# Patient Record
Sex: MALE | Race: BLACK OR AFRICAN AMERICAN | NOT HISPANIC OR LATINO | Employment: FULL TIME | URBAN - METROPOLITAN AREA
[De-identification: names, ages, dates, MRNs, and addresses within clinical notes are randomized per-mention and may not be internally consistent; named-entity substitution may affect disease eponyms.]

---

## 2023-03-07 ENCOUNTER — HOSPITAL ENCOUNTER (EMERGENCY)
Facility: HOSPITAL | Age: 43
Discharge: HOME | End: 2023-03-07
Attending: EMERGENCY MEDICINE
Payer: MEDICAID

## 2023-03-07 VITALS
WEIGHT: 205 LBS | RESPIRATION RATE: 20 BRPM | BODY MASS INDEX: 28.7 KG/M2 | HEART RATE: 106 BPM | SYSTOLIC BLOOD PRESSURE: 143 MMHG | HEIGHT: 71 IN | OXYGEN SATURATION: 99 % | DIASTOLIC BLOOD PRESSURE: 90 MMHG

## 2023-03-07 DIAGNOSIS — M25.559 HIP PAIN: ICD-10-CM

## 2023-03-07 DIAGNOSIS — M54.31 SCIATICA, RIGHT SIDE: Primary | ICD-10-CM

## 2023-03-07 PROCEDURE — 63600175 PHARM REV CODE 636 W HCPCS: Performed by: EMERGENCY MEDICINE

## 2023-03-07 PROCEDURE — 96372 THER/PROPH/DIAG INJ SC/IM: CPT | Performed by: EMERGENCY MEDICINE

## 2023-03-07 PROCEDURE — 99284 EMERGENCY DEPT VISIT MOD MDM: CPT

## 2023-03-07 PROCEDURE — 25000003 PHARM REV CODE 250: Performed by: EMERGENCY MEDICINE

## 2023-03-07 RX ORDER — HYDROCODONE BITARTRATE AND ACETAMINOPHEN 10; 325 MG/1; MG/1
1 TABLET ORAL
Status: COMPLETED | OUTPATIENT
Start: 2023-03-07 | End: 2023-03-07

## 2023-03-07 RX ORDER — KETOROLAC TROMETHAMINE 30 MG/ML
60 INJECTION, SOLUTION INTRAMUSCULAR; INTRAVENOUS
Status: COMPLETED | OUTPATIENT
Start: 2023-03-07 | End: 2023-03-07

## 2023-03-07 RX ORDER — PREDNISONE 50 MG/1
50 TABLET ORAL DAILY
Qty: 5 TABLET | Refills: 0 | Status: SHIPPED | OUTPATIENT
Start: 2023-03-07 | End: 2023-03-12

## 2023-03-07 RX ORDER — HYDROCODONE BITARTRATE AND ACETAMINOPHEN 7.5; 325 MG/1; MG/1
1 TABLET ORAL EVERY 6 HOURS PRN
Qty: 12 TABLET | Refills: 0 | Status: SHIPPED | OUTPATIENT
Start: 2023-03-07 | End: 2023-08-22 | Stop reason: HOSPADM

## 2023-03-07 RX ORDER — IBUPROFEN 800 MG/1
800 TABLET ORAL EVERY 6 HOURS PRN
Qty: 20 TABLET | Refills: 0 | Status: SHIPPED | OUTPATIENT
Start: 2023-03-07 | End: 2023-08-22 | Stop reason: HOSPADM

## 2023-03-07 RX ADMIN — KETOROLAC TROMETHAMINE 60 MG: 30 INJECTION, SOLUTION INTRAMUSCULAR; INTRAVENOUS at 11:03

## 2023-03-07 RX ADMIN — HYDROCODONE BITARTRATE AND ACETAMINOPHEN 1 TABLET: 10; 325 TABLET ORAL at 11:03

## 2023-03-07 NOTE — Clinical Note
"Maximiliano"Jackelyn Mock was seen and treated in our emergency department on 3/7/2023.  He may return to work on 03/10/2023.       If you have any questions or concerns, please don't hesitate to call.      Susan Muniz MD"

## 2023-03-08 NOTE — DISCHARGE INSTRUCTIONS
Your blood pressure is elevated.  You need to check it when you are not in pain and if it is still elevated (over 140/85) then you may need blood pressure medication.  An appointment was requested for you at Internal Medicine at [facility].  Also, you can call your insurance company to see if they have a primary care provider that you can follow-up with.

## 2023-03-08 NOTE — ED PROVIDER NOTES
Encounter Date: 3/7/2023       History     Chief Complaint   Patient presents with    Hip Pain     HIP PAIN AND SWELLING THAT BEGAN AT 12PM TODAY; REPORTS HF OF RECURRENT HIP PAIN DUE TO INJURY WHEN HE LIVED IN [place] >10YRS AGO;      42-year-old male states he in an injury to his right hip several years ago when he lived in [place] and now he gets intermittent hip pain every 2-3 months.  He states that his hip starts to swell and it becomes painful and he has difficulty standing on it.  He is had x-rays in the past which he states he believes were normal but has not had an MRI of his hip.  Initial blood pressure was 91/70 to and the nurse believes that in triage he had his crutch under his arm may have interfered with a blood pressure measurement.  Blood pressure in the exam room is 143/90.  He has no systemic symptoms including no fever, weakness, weight loss, or other complaint.      Review of patient's allergies indicates:  No Known Allergies  History reviewed. No pertinent past medical history.  History reviewed. No pertinent surgical history.  History reviewed. No pertinent family history.  Social History     Tobacco Use    Smoking status: Never    Smokeless tobacco: Never   Substance Use Topics    Alcohol use: Yes     Review of Systems   Musculoskeletal:  Positive for arthralgias and back pain.   All other systems reviewed and are negative.    Physical Exam     Initial Vitals [03/07/23 2251]   BP Pulse Resp Temp SpO2   91/72 106 18 -- 99 %      MAP       --         Physical Exam    Nursing note and vitals reviewed.  Constitutional: He appears well-developed and well-nourished.   Musculoskeletal:      Comments: Tenderness noted to the right lumbar paraspinous region and right upper buttocks.  Nontender to the right hip with no pain with internal and external rotation the hip.  Negative JASON test. Normal motor and sensation to the lower extremity     Neurological: He is alert and oriented to person, place,  and time. GCS score is 15. GCS eye subscore is 4. GCS verbal subscore is 5. GCS motor subscore is 6.   Skin: Capillary refill takes less than 2 seconds.   Psychiatric: He has a normal mood and affect. Thought content normal.       ED Course   Procedures  Labs Reviewed - No data to display       Imaging Results              X-Ray Lumbar Spine Ap And Lateral (In process)                      X-Ray Hip 2 or 3 views Right (with Pelvis when performed) (In process)  Result time 03/07/23 23:01:45                  X-Rays:   Independently Interpreted Readings:   Other Readings:  Preliminary reading of the right hip and lumbar spine x-ray is negative  Medications   ketorolac injection 60 mg (60 mg Intramuscular Given 3/7/23 2320)   HYDROcodone-acetaminophen  mg per tablet 1 tablet (1 tablet Oral Given 3/7/23 2320)     Medical Decision Making:   Initial Assessment:   42-year-old male states he in an injury to his right hip several years ago when he lived in [place] and now he gets intermittent hip pain every 2-3 months.  He states that his hip starts to swell and it becomes painful and he has difficulty standing on it.  He is had x-rays in the past which he states he believes were normal but has not had an MRI of his hip.  Initial blood pressure was 91/70 to and the nurse believes that in triage he had his crutch under his arm may have interfered with a blood pressure measurement.  Blood pressure in the exam room is 143/90.  He has no systemic symptoms including no fever, weakness, weight loss, or other complaint.  Differential Diagnosis:   Differential diagnosis includes but is not limited to hip DJD, labral tear, spine problems, SI joint problem  Clinical Tests:   Radiological Study: Reviewed  ED Management:  Patient was evaluated in the emergency room with history and physical exam and I found that his pain seems to be more lumbar and upper buttocks region and not so much in the hip.  Pain is not worsened with hip  movement.  X-ray of the low back and the right hip is normal.  I will treat him conservatively and recommend he follow-up with the PCP for further care.  Does not have a PCP so did a referral to [facility].  Blood pressure is borderline elevated which may be due to pain but he should see his PCP about the elevated blood pressure as well                        Clinical Impression:   Final diagnoses:  [M25.559] Hip pain  [M54.31] Sciatica, right side (Primary)        ED Disposition Condition    Discharge Stable          ED Prescriptions       Medication Sig Dispense Start Date End Date Auth. Provider    HYDROcodone-acetaminophen (NORCO) 7.5-325 mg per tablet Take 1 tablet by mouth every 6 (six) hours as needed for Pain. 12 tablet 3/7/2023 -- Susan Muniz MD    predniSONE (DELTASONE) 50 MG Tab Take 1 tablet (50 mg total) by mouth once daily. for 5 days 5 tablet 3/7/2023 3/12/2023 Susan Muniz MD    ibuprofen (ADVIL,MOTRIN) 800 MG tablet Take 1 tablet (800 mg total) by mouth every 6 (six) hours as needed for Pain. 20 tablet 3/7/2023 -- Susan Muniz MD          Follow-up Information       Follow up With Specialties Details Why Contact Info    PCP  Schedule an appointment as soon as possible for a visit                Susan Muniz MD  03/08/23 0101

## 2023-07-14 ENCOUNTER — HOSPITAL ENCOUNTER (EMERGENCY)
Facility: HOSPITAL | Age: 43
Discharge: HOME | End: 2023-07-14
Attending: EMERGENCY MEDICINE
Payer: MEDICAID

## 2023-07-14 VITALS
DIASTOLIC BLOOD PRESSURE: 93 MMHG | RESPIRATION RATE: 18 BRPM | HEART RATE: 99 BPM | OXYGEN SATURATION: 99 % | TEMPERATURE: 98 F | SYSTOLIC BLOOD PRESSURE: 142 MMHG

## 2023-07-14 DIAGNOSIS — M54.32 SCIATICA OF LEFT SIDE: Primary | ICD-10-CM

## 2023-07-14 PROCEDURE — 99284 EMERGENCY DEPT VISIT MOD MDM: CPT

## 2023-07-14 PROCEDURE — 96372 THER/PROPH/DIAG INJ SC/IM: CPT | Performed by: EMERGENCY MEDICINE

## 2023-07-14 PROCEDURE — 63600175 PHARM REV CODE 636 W HCPCS: Performed by: EMERGENCY MEDICINE

## 2023-07-14 RX ORDER — IBUPROFEN 800 MG/1
800 TABLET ORAL EVERY 8 HOURS PRN
Qty: 20 TABLET | Refills: 0 | Status: SHIPPED | OUTPATIENT
Start: 2023-07-14 | End: 2023-08-22 | Stop reason: HOSPADM

## 2023-07-14 RX ORDER — HYDROCODONE BITARTRATE AND ACETAMINOPHEN 7.5; 325 MG/1; MG/1
1 TABLET ORAL EVERY 6 HOURS PRN
Qty: 12 TABLET | Refills: 0 | Status: SHIPPED | OUTPATIENT
Start: 2023-07-14 | End: 2023-08-22 | Stop reason: HOSPADM

## 2023-07-14 RX ORDER — DEXAMETHASONE SODIUM PHOSPHATE 4 MG/ML
8 INJECTION, SOLUTION INTRA-ARTICULAR; INTRALESIONAL; INTRAMUSCULAR; INTRAVENOUS; SOFT TISSUE
Status: COMPLETED | OUTPATIENT
Start: 2023-07-14 | End: 2023-07-14

## 2023-07-14 RX ORDER — TIZANIDINE 4 MG/1
4 TABLET ORAL EVERY 6 HOURS PRN
Qty: 20 TABLET | Refills: 0 | Status: SHIPPED | OUTPATIENT
Start: 2023-07-14

## 2023-07-14 RX ADMIN — DEXAMETHASONE SODIUM PHOSPHATE 8 MG: 4 INJECTION, SOLUTION INTRA-ARTICULAR; INTRALESIONAL; INTRAMUSCULAR; INTRAVENOUS; SOFT TISSUE at 10:07

## 2023-07-14 NOTE — Clinical Note
"Maximiliano Salinascandido Mock was seen and treated in our emergency department on 7/14/2023.  He may return to work on 07/19/2023.       If you have any questions or concerns, please don't hesitate to call.      Susan Muniz MD"
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
## 2023-08-22 ENCOUNTER — OFFICE VISIT (OUTPATIENT)
Dept: INTERNAL MEDICINE | Facility: CLINIC | Age: 43
End: 2023-08-22
Payer: MEDICAID

## 2023-08-22 VITALS
TEMPERATURE: 98 F | SYSTOLIC BLOOD PRESSURE: 128 MMHG | HEART RATE: 84 BPM | OXYGEN SATURATION: 100 % | WEIGHT: 199.63 LBS | RESPIRATION RATE: 18 BRPM | BODY MASS INDEX: 27.84 KG/M2 | DIASTOLIC BLOOD PRESSURE: 79 MMHG

## 2023-08-22 DIAGNOSIS — M54.40 CHRONIC LOW BACK PAIN WITH SCIATICA, SCIATICA LATERALITY UNSPECIFIED, UNSPECIFIED BACK PAIN LATERALITY: ICD-10-CM

## 2023-08-22 DIAGNOSIS — Z28.21 VACCINATION DECLINED: ICD-10-CM

## 2023-08-22 DIAGNOSIS — Z76.89 ENCOUNTER TO ESTABLISH CARE WITH NEW DOCTOR: Primary | ICD-10-CM

## 2023-08-22 DIAGNOSIS — Z72.0 VAPES NICOTINE CONTAINING SUBSTANCE: ICD-10-CM

## 2023-08-22 DIAGNOSIS — M54.16 LUMBAR RADICULOPATHY, CHRONIC: ICD-10-CM

## 2023-08-22 DIAGNOSIS — Z00.00 HEALTHCARE MAINTENANCE: ICD-10-CM

## 2023-08-22 DIAGNOSIS — G89.29 CHRONIC LOW BACK PAIN WITH SCIATICA, SCIATICA LATERALITY UNSPECIFIED, UNSPECIFIED BACK PAIN LATERALITY: ICD-10-CM

## 2023-08-22 PROCEDURE — 99214 OFFICE O/P EST MOD 30 MIN: CPT | Mod: PBBFAC

## 2023-08-22 RX ORDER — MELOXICAM 15 MG/1
15 TABLET ORAL DAILY
Qty: 30 TABLET | Refills: 2 | Status: SHIPPED | OUTPATIENT
Start: 2023-08-22 | End: 2023-11-20
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
